# Patient Record
Sex: MALE | Race: WHITE | Employment: FULL TIME | URBAN - METROPOLITAN AREA
[De-identification: names, ages, dates, MRNs, and addresses within clinical notes are randomized per-mention and may not be internally consistent; named-entity substitution may affect disease eponyms.]

---

## 2023-10-16 ENCOUNTER — OFFICE VISIT (OUTPATIENT)
Age: 33
End: 2023-10-16

## 2023-10-16 VITALS
WEIGHT: 190.4 LBS | TEMPERATURE: 98.3 F | HEIGHT: 69 IN | HEART RATE: 92 BPM | OXYGEN SATURATION: 94 % | SYSTOLIC BLOOD PRESSURE: 134 MMHG | BODY MASS INDEX: 28.2 KG/M2 | DIASTOLIC BLOOD PRESSURE: 87 MMHG | RESPIRATION RATE: 18 BRPM

## 2023-10-16 DIAGNOSIS — R51.9 ACUTE NONINTRACTABLE HEADACHE, UNSPECIFIED HEADACHE TYPE: Primary | ICD-10-CM

## 2023-10-16 RX ORDER — LOVASTATIN 10 MG/1
10 TABLET ORAL NIGHTLY
COMMUNITY

## 2023-10-16 ASSESSMENT — ENCOUNTER SYMPTOMS
NAUSEA: 0
COUGH: 0
VOMITING: 0
WHEEZING: 0
SORE THROAT: 0
BACK PAIN: 0
SINUS PRESSURE: 0
ABDOMINAL PAIN: 0
SHORTNESS OF BREATH: 0

## 2023-12-12 ENCOUNTER — OFFICE VISIT (OUTPATIENT)
Age: 33
End: 2023-12-12

## 2023-12-12 VITALS
SYSTOLIC BLOOD PRESSURE: 130 MMHG | OXYGEN SATURATION: 96 % | HEART RATE: 98 BPM | DIASTOLIC BLOOD PRESSURE: 80 MMHG | BODY MASS INDEX: 30.72 KG/M2 | RESPIRATION RATE: 16 BRPM | WEIGHT: 208 LBS | TEMPERATURE: 98.1 F

## 2023-12-12 DIAGNOSIS — R05.1 ACUTE COUGH: Primary | ICD-10-CM

## 2023-12-12 DIAGNOSIS — J20.9 ACUTE BRONCHITIS, UNSPECIFIED ORGANISM: ICD-10-CM

## 2023-12-12 LAB
Lab: NORMAL
QC PASS/FAIL: NORMAL
SARS-COV-2 RDRP RESP QL NAA+PROBE: NEGATIVE

## 2023-12-12 RX ORDER — PREDNISONE 20 MG/1
20 TABLET ORAL 2 TIMES DAILY
Qty: 10 TABLET | Refills: 0 | Status: SHIPPED | OUTPATIENT
Start: 2023-12-12 | End: 2023-12-17

## 2023-12-12 RX ORDER — DEXTROMETHORPHAN HYDROBROMIDE AND PROMETHAZINE HYDROCHLORIDE 15; 6.25 MG/5ML; MG/5ML
5 SYRUP ORAL 4 TIMES DAILY PRN
Qty: 120 ML | Refills: 0 | Status: SHIPPED | OUTPATIENT
Start: 2023-12-12 | End: 2023-12-19

## 2023-12-12 RX ORDER — AZITHROMYCIN 250 MG/1
250 TABLET, FILM COATED ORAL SEE ADMIN INSTRUCTIONS
Qty: 6 TABLET | Refills: 0 | Status: SHIPPED | OUTPATIENT
Start: 2023-12-12 | End: 2023-12-17

## 2023-12-12 ASSESSMENT — ENCOUNTER SYMPTOMS
RHINORRHEA: 0
SINUS PRESSURE: 1
HEARTBURN: 0
COUGH: 1
BACK PAIN: 0
SHORTNESS OF BREATH: 0
EYE REDNESS: 0
WHEEZING: 0
SORE THROAT: 0
HEMOPTYSIS: 0

## 2024-01-26 ENCOUNTER — OFFICE VISIT (OUTPATIENT)
Age: 34
End: 2024-01-26

## 2024-01-26 VITALS
SYSTOLIC BLOOD PRESSURE: 123 MMHG | RESPIRATION RATE: 15 BRPM | BODY MASS INDEX: 32.14 KG/M2 | OXYGEN SATURATION: 96 % | HEART RATE: 105 BPM | DIASTOLIC BLOOD PRESSURE: 83 MMHG | HEIGHT: 69 IN | WEIGHT: 217 LBS | TEMPERATURE: 98.8 F

## 2024-01-26 DIAGNOSIS — J20.9 ACUTE BRONCHITIS, UNSPECIFIED ORGANISM: Primary | ICD-10-CM

## 2024-01-26 RX ORDER — PREDNISONE 20 MG/1
20 TABLET ORAL 2 TIMES DAILY
Qty: 10 TABLET | Refills: 0 | Status: SHIPPED | OUTPATIENT
Start: 2024-01-26 | End: 2024-01-31

## 2024-01-26 RX ORDER — ALBUTEROL SULFATE 90 UG/1
2 AEROSOL, METERED RESPIRATORY (INHALATION) 4 TIMES DAILY PRN
Qty: 18 G | Refills: 0 | Status: SHIPPED | OUTPATIENT
Start: 2024-01-26

## 2024-01-26 RX ORDER — LEVOFLOXACIN 500 MG/1
500 TABLET, FILM COATED ORAL DAILY
Qty: 10 TABLET | Refills: 0 | Status: SHIPPED | OUTPATIENT
Start: 2024-01-26 | End: 2024-02-05

## 2024-01-26 RX ORDER — DEXTROMETHORPHAN HYDROBROMIDE AND PROMETHAZINE HYDROCHLORIDE 15; 6.25 MG/5ML; MG/5ML
5 SYRUP ORAL 4 TIMES DAILY PRN
Qty: 120 ML | Refills: 0 | Status: SHIPPED | OUTPATIENT
Start: 2024-01-26 | End: 2024-02-02

## 2024-01-26 ASSESSMENT — ENCOUNTER SYMPTOMS
HEMOPTYSIS: 0
ABDOMINAL PAIN: 0
CHEST TIGHTNESS: 0
RHINORRHEA: 0
VOMITING: 0
COUGH: 1
WHEEZING: 1
SHORTNESS OF BREATH: 0
DIARRHEA: 0
SORE THROAT: 0
SINUS PRESSURE: 1

## 2024-01-26 NOTE — PROGRESS NOTES
Norbert Rosenbaum (:  1990) is a 33 y.o. male,Established patient, here for evaluation of the following chief complaint(s):  Cough (Cough / chest congestion with chills for 1 week )      ASSESSMENT/PLAN:  1. Acute bronchitis, unspecified organism    - predniSONE (DELTASONE) 20 MG tablet; Take 1 tablet by mouth 2 times daily for 5 days  Dispense: 10 tablet; Refill: 0  - promethazine-dextromethorphan (PROMETHAZINE-DM) 6.25-15 MG/5ML syrup; Take 5 mLs by mouth 4 times daily as needed for Cough  Dispense: 120 mL; Refill: 0  - albuterol sulfate HFA (VENTOLIN HFA) 108 (90 Base) MCG/ACT inhaler; Inhale 2 puffs into the lungs 4 times daily as needed for Wheezing  Dispense: 18 g; Refill: 0  - levoFLOXacin (LEVAQUIN) 500 MG tablet; Take 1 tablet by mouth daily for 10 days  Dispense: 10 tablet; Refill: 0     -rest,increase fluid intake,stop smoking,return to  if worsening symptoms  No follow-ups on file.    SUBJECTIVE/OBJECTIVE:    History provided by:  Patient  Cough  The current episode started 1 to 4 weeks ago. The problem has been gradually worsening. The cough is Productive of sputum. Associated symptoms include nasal congestion and wheezing. Pertinent negatives include no chest pain, chills, fever, headaches, heartburn, hemoptysis, myalgias, rash, rhinorrhea, sore throat, shortness of breath or sweats.       Vitals:    24 1433   BP: 123/83   Site: Right Upper Arm   Position: Sitting   Cuff Size: Large Adult   Pulse: (!) 105   Resp: 15   Temp: 98.8 °F (37.1 °C)   TempSrc: Oral   SpO2: 96%   Weight: 98.4 kg (217 lb)   Height: 1.753 m (5' 9\")       Review of Systems   Constitutional:  Positive for fatigue. Negative for activity change, appetite change, chills, diaphoresis and fever.   HENT:  Positive for congestion and sinus pressure. Negative for rhinorrhea and sore throat.    Respiratory:  Positive for cough and wheezing. Negative for hemoptysis, chest tightness and shortness of breath.    Cardiovascular:

## 2024-02-05 ENCOUNTER — OFFICE VISIT (OUTPATIENT)
Age: 34
End: 2024-02-05

## 2024-02-05 VITALS
WEIGHT: 207.2 LBS | TEMPERATURE: 99 F | OXYGEN SATURATION: 94 % | HEIGHT: 69 IN | DIASTOLIC BLOOD PRESSURE: 82 MMHG | SYSTOLIC BLOOD PRESSURE: 131 MMHG | BODY MASS INDEX: 30.69 KG/M2 | HEART RATE: 120 BPM | RESPIRATION RATE: 20 BRPM

## 2024-02-05 DIAGNOSIS — J06.9 UPPER RESPIRATORY TRACT INFECTION, UNSPECIFIED TYPE: ICD-10-CM

## 2024-02-05 DIAGNOSIS — R05.1 ACUTE COUGH: Primary | ICD-10-CM

## 2024-02-05 LAB
INFLUENZA VIRUS A RNA: NORMAL
INFLUENZA VIRUS B RNA: NORMAL
Lab: NORMAL
PERFORMING INSTRUMENT: NORMAL
QC PASS/FAIL: NORMAL
SARS-COV-2, POC: NORMAL

## 2024-02-05 RX ORDER — GUAIFENESIN AND DEXTROMETHORPHAN HYDROBROMIDE 600; 30 MG/1; MG/1
1 TABLET, EXTENDED RELEASE ORAL 2 TIMES DAILY
Qty: 20 TABLET | Refills: 0 | Status: SHIPPED | OUTPATIENT
Start: 2024-02-05

## 2024-02-05 ASSESSMENT — ENCOUNTER SYMPTOMS
COUGH: 1
WHEEZING: 1
RHINORRHEA: 0
SINUS PRESSURE: 1
SHORTNESS OF BREATH: 0
SORE THROAT: 0

## 2024-02-05 NOTE — PROGRESS NOTES
Norbert Rosenbaum (:  1990) is a 33 y.o. male,Established patient, here for evaluation of the following chief complaint(s):  Cough (Pt also c/o congestion, PT says antibiotic didn't work )      ASSESSMENT/PLAN:  1. Acute cough    - POCT COVID-19, Antigen  - POCT Influenza A/B DNA (Alere i)    2. Upper respiratory tract infection, unspecified type    - Dextromethorphan-guaiFENesin (MUCINEX DM)  MG TB12; Take 1 tablet by mouth in the morning and at bedtime  Dispense: 20 tablet; Refill: 0     -increase fluid intake,take Tylenol as needed,return to  if worsening symptoms  Return if symptoms worsen or fail to improve.    SUBJECTIVE/OBJECTIVE:  Pt was seen on ,s/p prednisone,finished Levaquin yesterday,c/o 3 day h/o cough      History provided by:  Patient  Cough  This is a new problem. The problem has been unchanged. The cough is Productive of sputum. Associated symptoms include nasal congestion and wheezing. Pertinent negatives include no chest pain, chills, fever, headaches, rash, rhinorrhea, sore throat, shortness of breath or sweats.       Vitals:    24 1846   BP: 131/82   Pulse: (!) 120   Resp: 20   Temp: 99 °F (37.2 °C)   SpO2: 94%   Weight: 94 kg (207 lb 3.2 oz)   Height: 1.753 m (5' 9\")       Review of Systems   Constitutional:  Negative for activity change, appetite change, chills and fever.   HENT:  Positive for congestion and sinus pressure. Negative for rhinorrhea and sore throat.    Respiratory:  Positive for cough and wheezing. Negative for shortness of breath.    Cardiovascular:  Negative for chest pain.   Skin:  Negative for rash.   Neurological:  Negative for headaches.       Physical Exam  Constitutional:       General: He is not in acute distress.  HENT:      Nose: Congestion present. No rhinorrhea.      Mouth/Throat:      Mouth: Mucous membranes are moist.      Pharynx: No oropharyngeal exudate or posterior oropharyngeal erythema.   Eyes:      Conjunctiva/sclera: Conjunctivae

## 2024-03-11 ENCOUNTER — OFFICE VISIT (OUTPATIENT)
Age: 34
End: 2024-03-11

## 2024-03-11 VITALS
TEMPERATURE: 97.8 F | RESPIRATION RATE: 16 BRPM | SYSTOLIC BLOOD PRESSURE: 122 MMHG | WEIGHT: 214 LBS | DIASTOLIC BLOOD PRESSURE: 78 MMHG | BODY MASS INDEX: 31.6 KG/M2 | HEART RATE: 86 BPM | OXYGEN SATURATION: 94 %

## 2024-03-11 DIAGNOSIS — R51.9 NONINTRACTABLE HEADACHE, UNSPECIFIED CHRONICITY PATTERN, UNSPECIFIED HEADACHE TYPE: ICD-10-CM

## 2024-03-11 DIAGNOSIS — R11.0 NAUSEA: Primary | ICD-10-CM

## 2024-03-11 RX ORDER — BUPROPION HYDROCHLORIDE 100 MG/1
300 TABLET ORAL DAILY
COMMUNITY

## 2024-03-11 RX ORDER — ONDANSETRON 4 MG/1
4 TABLET, ORALLY DISINTEGRATING ORAL 3 TIMES DAILY PRN
Qty: 21 TABLET | Refills: 0 | Status: SHIPPED | OUTPATIENT
Start: 2024-03-11

## 2024-03-11 RX ORDER — OMEGA-3-ACID ETHYL ESTERS 1 G/1
CAPSULE, LIQUID FILLED ORAL
COMMUNITY
Start: 2024-01-30

## 2024-03-11 RX ORDER — LOSARTAN POTASSIUM 50 MG/1
TABLET ORAL
COMMUNITY
Start: 2023-05-07

## 2024-03-11 RX ORDER — IBUPROFEN 600 MG/1
600 TABLET ORAL 3 TIMES DAILY PRN
Qty: 30 TABLET | Refills: 0 | Status: SHIPPED | OUTPATIENT
Start: 2024-03-11

## 2024-03-11 ASSESSMENT — ENCOUNTER SYMPTOMS
ABDOMINAL PAIN: 0
EYE REDNESS: 0
BACK PAIN: 0
SINUS PRESSURE: 0
DIARRHEA: 0
CHEST TIGHTNESS: 0
VOMITING: 0
CONSTIPATION: 0
SHORTNESS OF BREATH: 0
NAUSEA: 1
COUGH: 0
SORE THROAT: 0

## 2024-03-11 NOTE — PROGRESS NOTES
Eyes:      Conjunctiva/sclera: Conjunctivae normal.      Pupils: Pupils are equal, round, and reactive to light.   Cardiovascular:      Rate and Rhythm: Normal rate and regular rhythm.   Pulmonary:      Effort: Pulmonary effort is normal. No respiratory distress.      Breath sounds: Normal breath sounds.   Abdominal:      General: Bowel sounds are normal. There is no distension.      Palpations: Abdomen is soft.      Tenderness: There is no abdominal tenderness.      Hernia: No hernia is present.   Musculoskeletal:      Cervical back: Neck supple. No rigidity.   Lymphadenopathy:      Cervical: No cervical adenopathy.   Skin:     Findings: No rash.   Neurological:      General: No focal deficit present.      Mental Status: He is alert and oriented to person, place, and time.           An electronic signature was used to authenticate this note.    --VELIA DUGGAN MD

## 2024-04-17 ENCOUNTER — OFFICE VISIT (OUTPATIENT)
Age: 34
End: 2024-04-17

## 2024-04-17 VITALS
WEIGHT: 211.6 LBS | BODY MASS INDEX: 31.25 KG/M2 | SYSTOLIC BLOOD PRESSURE: 127 MMHG | HEART RATE: 100 BPM | OXYGEN SATURATION: 96 % | DIASTOLIC BLOOD PRESSURE: 82 MMHG | TEMPERATURE: 97.6 F

## 2024-04-17 DIAGNOSIS — F31.9 BIPOLAR 1 DISORDER (HCC): Primary | ICD-10-CM

## 2024-04-17 RX ORDER — QUETIAPINE FUMARATE 100 MG/1
100 TABLET, FILM COATED ORAL DAILY
Qty: 7 TABLET | Refills: 0 | Status: SHIPPED | OUTPATIENT
Start: 2024-04-17

## 2024-04-17 ASSESSMENT — ENCOUNTER SYMPTOMS: COUGH: 0

## 2024-04-17 NOTE — PROGRESS NOTES
Norbert Rosenbaum (:  1990) is a 33 y.o. male,Established patient, here for evaluation of the following chief complaint(s):  Other (Patient states he is needing a refill on his Seroquel, has not taken in the last week, will be going to his doctor tomorrow morning. )      ASSESSMENT/PLAN:  1. Bipolar 1 disorder (HCC)    - QUEtiapine (SEROQUEL) 100 MG tablet; Take 1 tablet by mouth daily  Dispense: 7 tablet; Refill: 0       No follow-ups on file.    SUBJECTIVE/OBJECTIVE:  Pt has a h/o Bipolar disorder,out of Seroquel,has appoint with his doctor tomorrow.      History provided by:  Patient      Vitals:    24 1746   BP: 127/82   Site: Right Upper Arm   Position: Sitting   Cuff Size: Large Adult   Pulse: 100   Temp: 97.6 °F (36.4 °C)   TempSrc: Oral   SpO2: 96%   Weight: 96 kg (211 lb 9.6 oz)       Review of Systems   Constitutional:  Negative for activity change, appetite change, fatigue and fever.   Respiratory:  Negative for cough.    Cardiovascular:  Negative for chest pain.   Neurological:  Negative for dizziness and headaches.   Psychiatric/Behavioral:  The patient is nervous/anxious.        Physical Exam  Constitutional:       General: He is not in acute distress.  HENT:      Mouth/Throat:      Mouth: Mucous membranes are moist.      Pharynx: No posterior oropharyngeal erythema.   Eyes:      Conjunctiva/sclera: Conjunctivae normal.      Pupils: Pupils are equal, round, and reactive to light.   Cardiovascular:      Rate and Rhythm: Normal rate.   Pulmonary:      Effort: Pulmonary effort is normal. No respiratory distress.   Musculoskeletal:      Cervical back: Neck supple. No rigidity.   Lymphadenopathy:      Cervical: No cervical adenopathy.   Skin:     Findings: No rash.   Neurological:      General: No focal deficit present.      Mental Status: He is alert and oriented to person, place, and time.   Psychiatric:         Mood and Affect: Mood normal.         Behavior: Behavior normal.           An

## 2024-07-10 ENCOUNTER — OFFICE VISIT (OUTPATIENT)
Age: 34
End: 2024-07-10

## 2024-07-10 VITALS
HEIGHT: 69 IN | SYSTOLIC BLOOD PRESSURE: 100 MMHG | TEMPERATURE: 97.9 F | WEIGHT: 216 LBS | DIASTOLIC BLOOD PRESSURE: 67 MMHG | BODY MASS INDEX: 31.99 KG/M2 | HEART RATE: 95 BPM | OXYGEN SATURATION: 96 %

## 2024-07-10 DIAGNOSIS — S49.91XA INJURY OF RIGHT UPPER EXTREMITY, INITIAL ENCOUNTER: Primary | ICD-10-CM

## 2024-07-10 RX ORDER — IBUPROFEN 600 MG/1
600 TABLET ORAL EVERY 8 HOURS PRN
Qty: 10 TABLET | Refills: 0 | Status: SHIPPED | OUTPATIENT
Start: 2024-07-10

## 2024-07-10 RX ORDER — OMEPRAZOLE 10 MG/1
10 CAPSULE, DELAYED RELEASE ORAL DAILY
COMMUNITY

## 2024-07-10 RX ORDER — CYCLOBENZAPRINE HCL 10 MG
10 TABLET ORAL EVERY 8 HOURS PRN
Qty: 21 TABLET | Refills: 0 | Status: SHIPPED | OUTPATIENT
Start: 2024-07-10 | End: 2024-07-20

## 2024-07-10 NOTE — PROGRESS NOTES
Norbert Rosenbaum (:  1990) is a 34 y.o. male,Established patient, here for evaluation of the following chief complaint(s):  Arm Pain (Right upper arm pain after moving  few days ago, unsure of exact injury )    Assessment & Plan :  Visit Diagnoses and Associated Orders       Injury of right upper extremity, initial encounter    -  Primary         ORDERS WITHOUT AN ASSOCIATED DIAGNOSIS    omeprazole (PRILOSEC) 10 MG delayed release capsule [[id number]]            Right arm injury that occurred with a twisting and lifting motion. Ibuprofen/acetaminophen every 8 hours as needed for pain. Cyclobenzaprine every 8 hours as needed  for spasms/pain.       Referral to orthopedic surgeon for right arm weakness/injury.     Patient verbalized understanding of printed and verbal discharge instructions including follow up care.    Follow up in 7 days if symptoms persist or if symptoms worsen.       Subjective :    History provided by:  Patient  Arm Pain   The incident occurred 2 days ago. The incident occurred at work. The injury mechanism was twisted. The pain is present in the right elbow, upper right arm and right forearm. The quality of the pain is described as shooting. The pain does not radiate. The pain is at a severity of 1/10. The pain is mild. The pain has been Fluctuating since the incident. The symptoms are aggravated by movement.       34 y.o. male presents with symptoms of   pain in the right arm that started two days ago after flipping a large tractor tire, and then was picking it up and flipping it onto his tailgate.   Patient is a [employer] employee.         Vitals:    07/10/24 1458   BP: 100/67   Site: Left Upper Arm   Position: Sitting   Cuff Size: Large Adult   Pulse: 95   Temp: 97.9 °F (36.6 °C)   TempSrc: Oral   SpO2: 96%   Weight: 98 kg (216 lb)   Height: 1.753 m (5' 9\")       No results found for this visit on 07/10/24.      Objective   Physical Exam  Vitals and nursing note reviewed.   Constitutional:

## 2024-10-02 ENCOUNTER — OFFICE VISIT (OUTPATIENT)
Age: 34
End: 2024-10-02

## 2024-10-02 VITALS
HEART RATE: 78 BPM | WEIGHT: 226.6 LBS | TEMPERATURE: 98.1 F | DIASTOLIC BLOOD PRESSURE: 79 MMHG | HEIGHT: 69 IN | BODY MASS INDEX: 33.56 KG/M2 | OXYGEN SATURATION: 97 % | RESPIRATION RATE: 18 BRPM | SYSTOLIC BLOOD PRESSURE: 118 MMHG

## 2024-10-02 DIAGNOSIS — L23.7 POISON IVY DERMATITIS: Primary | ICD-10-CM

## 2024-10-02 RX ORDER — PREDNISONE 20 MG/1
20 TABLET ORAL 2 TIMES DAILY
Qty: 10 TABLET | Refills: 0 | Status: SHIPPED | OUTPATIENT
Start: 2024-10-02 | End: 2024-10-07

## 2024-10-02 RX ORDER — TRIAMCINOLONE ACETONIDE 0.25 MG/G
CREAM TOPICAL
Qty: 15 G | Refills: 0 | Status: SHIPPED | OUTPATIENT
Start: 2024-10-02

## 2024-10-02 NOTE — PROGRESS NOTES
Norbert Rosenbaum (:  1990) is a 34 y.o. male,Established patient, here for evaluation of the following chief complaint(s):  Poison Ivy (Spread all over. Sxs started last week.)      Assessment & Plan :  Visit Diagnoses and Associated Orders       Poison ivy dermatitis    -  Primary    predniSONE (DELTASONE) 20 MG tablet [[id number]]      triamcinolone (KENALOG) 0.025 % cream [[id number]]                   Take medications as directed. Follow up with PCP if symptoms do not improve. Emergency follow up required for symptoms including, but not limited to, shortness of breath, mental status change, fevers >101, difficulty swallowing or inability to swallow, dehydration, or rapid worsening of symptoms.  See printed instructions given at discharge.     Subjective :  Poison Ivy (Spread all over. Sxs started last week.)  Diffuse maculopapular/vesicular, itchy rash noted.      History provided by:  Patient  Poison Ivy  This is a new problem. The current episode started in the past 7 days. The problem has been waxing and waning since onset. The rash is diffuse. The rash is characterized by itchiness and redness. Past treatments include nothing. The treatment provided mild relief.       34 y.o. male presents with symptoms of diffuse poison ivy         There were no vitals filed for this visit.    No results found for this visit on 10/02/24.      Objective   Physical Exam  Constitutional:       General: He is not in acute distress.     Appearance: He is well-developed.   HENT:      Right Ear: External ear normal.      Left Ear: External ear normal.      Nose: Nose normal.      Mouth/Throat:      Lips: Pink.   Eyes:      General: Lids are normal.   Cardiovascular:      Rate and Rhythm: Normal rate.   Pulmonary:      Effort: Pulmonary effort is normal.   Skin:     General: Skin is warm and dry.      Findings: Erythema and rash present. Rash is macular, papular and vesicular.   Neurological:      Mental Status: He is alert.

## 2024-10-02 NOTE — PATIENT INSTRUCTIONS
Discharge medications reviewed with the patient and appropriate educational materials and side effects teaching were provided.    Please follow discharge education given at discharge.

## 2025-04-09 ENCOUNTER — OFFICE VISIT (OUTPATIENT)
Dept: PRIMARY CARE CLINIC | Age: 35
End: 2025-04-09
Payer: MEDICAID

## 2025-04-09 VITALS
HEART RATE: 65 BPM | HEIGHT: 68 IN | WEIGHT: 218 LBS | BODY MASS INDEX: 33.04 KG/M2 | SYSTOLIC BLOOD PRESSURE: 128 MMHG | OXYGEN SATURATION: 98 % | DIASTOLIC BLOOD PRESSURE: 74 MMHG

## 2025-04-09 DIAGNOSIS — F31.9 BIPOLAR AFFECTIVE DISORDER, REMISSION STATUS UNSPECIFIED (HCC): ICD-10-CM

## 2025-04-09 DIAGNOSIS — Z00.01 ENCOUNTER FOR ROUTINE ADULT HEALTH EXAMINATION WITH ABNORMAL FINDINGS: ICD-10-CM

## 2025-04-09 DIAGNOSIS — E11.9 TYPE 2 DIABETES MELLITUS WITHOUT COMPLICATION, WITHOUT LONG-TERM CURRENT USE OF INSULIN: ICD-10-CM

## 2025-04-09 DIAGNOSIS — Z87.898 HISTORY OF BENZODIAZEPINE USE: ICD-10-CM

## 2025-04-09 DIAGNOSIS — F20.9 SCHIZOPHRENIA, UNSPECIFIED TYPE: ICD-10-CM

## 2025-04-09 DIAGNOSIS — I10 HYPERTENSION, UNSPECIFIED TYPE: ICD-10-CM

## 2025-04-09 DIAGNOSIS — Z00.01 ENCOUNTER FOR ROUTINE ADULT HEALTH EXAMINATION WITH ABNORMAL FINDINGS: Primary | ICD-10-CM

## 2025-04-09 LAB
ALBUMIN SERPL-MCNC: 4.5 G/DL (ref 3.4–5)
ALBUMIN/GLOB SERPL: 2 {RATIO} (ref 1.1–2.2)
ALP SERPL-CCNC: 57 U/L (ref 40–129)
ALT SERPL-CCNC: 35 U/L (ref 10–40)
ANION GAP SERPL CALCULATED.3IONS-SCNC: 10 MMOL/L (ref 3–16)
AST SERPL-CCNC: 23 U/L (ref 15–37)
BILIRUB SERPL-MCNC: 0.3 MG/DL (ref 0–1)
BUN SERPL-MCNC: 9 MG/DL (ref 7–20)
CALCIUM SERPL-MCNC: 9.5 MG/DL (ref 8.3–10.6)
CHLORIDE SERPL-SCNC: 102 MMOL/L (ref 99–110)
CHOLEST SERPL-MCNC: 162 MG/DL (ref 0–199)
CO2 SERPL-SCNC: 28 MMOL/L (ref 21–32)
CREAT SERPL-MCNC: 1 MG/DL (ref 0.9–1.3)
CREAT UR-MCNC: 137 MG/DL (ref 39–259)
DEPRECATED RDW RBC AUTO: 13 % (ref 12.4–15.4)
GFR SERPLBLD CREATININE-BSD FMLA CKD-EPI: >90 ML/MIN/{1.73_M2}
GLUCOSE SERPL-MCNC: 160 MG/DL (ref 70–99)
HCT VFR BLD AUTO: 46.6 % (ref 40.5–52.5)
HDLC SERPL-MCNC: 36 MG/DL (ref 40–60)
HGB BLD-MCNC: 15.7 G/DL (ref 13.5–17.5)
LDLC SERPL CALC-MCNC: 105 MG/DL
MCH RBC QN AUTO: 30 PG (ref 26–34)
MCHC RBC AUTO-ENTMCNC: 33.6 G/DL (ref 31–36)
MCV RBC AUTO: 89.5 FL (ref 80–100)
MICROALBUMIN UR DL<=1MG/L-MCNC: <1.2 MG/DL
MICROALBUMIN/CREAT UR: NORMAL MG/G (ref 0–30)
PLATELET # BLD AUTO: 185 K/UL (ref 135–450)
PMV BLD AUTO: 10.4 FL (ref 5–10.5)
POTASSIUM SERPL-SCNC: 4.8 MMOL/L (ref 3.5–5.1)
PROT SERPL-MCNC: 6.8 G/DL (ref 6.4–8.2)
RBC # BLD AUTO: 5.21 M/UL (ref 4.2–5.9)
SODIUM SERPL-SCNC: 140 MMOL/L (ref 136–145)
TRIGL SERPL-MCNC: 104 MG/DL (ref 0–150)
TSH SERPL DL<=0.005 MIU/L-ACNC: 1.74 UIU/ML (ref 0.27–4.2)
VLDLC SERPL CALC-MCNC: 21 MG/DL
WBC # BLD AUTO: 5.7 K/UL (ref 4–11)

## 2025-04-09 PROCEDURE — 99204 OFFICE O/P NEW MOD 45 MIN: CPT | Performed by: STUDENT IN AN ORGANIZED HEALTH CARE EDUCATION/TRAINING PROGRAM

## 2025-04-09 PROCEDURE — 3078F DIAST BP <80 MM HG: CPT | Performed by: STUDENT IN AN ORGANIZED HEALTH CARE EDUCATION/TRAINING PROGRAM

## 2025-04-09 PROCEDURE — 3074F SYST BP LT 130 MM HG: CPT | Performed by: STUDENT IN AN ORGANIZED HEALTH CARE EDUCATION/TRAINING PROGRAM

## 2025-04-09 PROCEDURE — 99385 PREV VISIT NEW AGE 18-39: CPT | Performed by: STUDENT IN AN ORGANIZED HEALTH CARE EDUCATION/TRAINING PROGRAM

## 2025-04-09 RX ORDER — LOSARTAN POTASSIUM 50 MG/1
50 TABLET ORAL DAILY
Qty: 30 TABLET | Refills: 0 | Status: SHIPPED | OUTPATIENT
Start: 2025-04-09

## 2025-04-09 RX ORDER — BLOOD SUGAR DIAGNOSTIC
1 STRIP MISCELLANEOUS 3 TIMES DAILY
COMMUNITY
Start: 2025-03-06

## 2025-04-09 RX ORDER — GUANFACINE 2 MG/1
2 TABLET ORAL DAILY
COMMUNITY
Start: 2025-02-19

## 2025-04-09 RX ORDER — LOSARTAN POTASSIUM 50 MG/1
50 TABLET ORAL DAILY
COMMUNITY
Start: 2025-02-06 | End: 2025-04-09 | Stop reason: SDUPTHER

## 2025-04-09 RX ORDER — QUETIAPINE FUMARATE 400 MG/1
400 TABLET, FILM COATED ORAL
COMMUNITY
Start: 2025-02-28

## 2025-04-09 RX ORDER — OMEGA-3-ACID ETHYL ESTERS 1 G/1
3 CAPSULE, LIQUID FILLED ORAL DAILY
COMMUNITY
Start: 2025-02-06

## 2025-04-09 RX ORDER — SILDENAFIL 100 MG/1
TABLET, FILM COATED ORAL
COMMUNITY
Start: 2025-01-18

## 2025-04-09 RX ORDER — BUPROPION HYDROCHLORIDE 150 MG/1
150 TABLET ORAL EVERY MORNING
COMMUNITY
Start: 2025-02-23

## 2025-04-09 RX ORDER — BLOOD-GLUCOSE METER
EACH MISCELLANEOUS
COMMUNITY
Start: 2025-03-08

## 2025-04-09 RX ORDER — LANCETS 33 GAUGE
1 EACH MISCELLANEOUS 3 TIMES DAILY
COMMUNITY
Start: 2025-03-06

## 2025-04-09 RX ORDER — METFORMIN HYDROCHLORIDE EXTENDED-RELEASE TABLETS 500 MG/1
500 TABLET, FILM COATED, EXTENDED RELEASE ORAL 2 TIMES DAILY WITH MEALS
COMMUNITY
Start: 2025-03-08 | End: 2025-04-09 | Stop reason: ALTCHOICE

## 2025-04-09 SDOH — ECONOMIC STABILITY: FOOD INSECURITY: WITHIN THE PAST 12 MONTHS, THE FOOD YOU BOUGHT JUST DIDN'T LAST AND YOU DIDN'T HAVE MONEY TO GET MORE.: NEVER TRUE

## 2025-04-09 SDOH — ECONOMIC STABILITY: FOOD INSECURITY: WITHIN THE PAST 12 MONTHS, YOU WORRIED THAT YOUR FOOD WOULD RUN OUT BEFORE YOU GOT MONEY TO BUY MORE.: NEVER TRUE

## 2025-04-09 ASSESSMENT — COLUMBIA-SUICIDE SEVERITY RATING SCALE - C-SSRS
1. WITHIN THE PAST MONTH, HAVE YOU WISHED YOU WERE DEAD OR WISHED YOU COULD GO TO SLEEP AND NOT WAKE UP?: NO
6. HAVE YOU EVER DONE ANYTHING, STARTED TO DO ANYTHING, OR PREPARED TO DO ANYTHING TO END YOUR LIFE?: NO
2. HAVE YOU ACTUALLY HAD ANY THOUGHTS OF KILLING YOURSELF?: NO

## 2025-04-09 ASSESSMENT — ENCOUNTER SYMPTOMS
COUGH: 0
SHORTNESS OF BREATH: 0
NAUSEA: 0
VOMITING: 0
DIARRHEA: 0
ABDOMINAL PAIN: 0

## 2025-04-09 ASSESSMENT — PATIENT HEALTH QUESTIONNAIRE - PHQ9
3. TROUBLE FALLING OR STAYING ASLEEP: NEARLY EVERY DAY
5. POOR APPETITE OR OVEREATING: NEARLY EVERY DAY
7. TROUBLE CONCENTRATING ON THINGS, SUCH AS READING THE NEWSPAPER OR WATCHING TELEVISION: MORE THAN HALF THE DAYS
2. FEELING DOWN, DEPRESSED OR HOPELESS: NEARLY EVERY DAY
SUM OF ALL RESPONSES TO PHQ QUESTIONS 1-9: 23
4. FEELING TIRED OR HAVING LITTLE ENERGY: NEARLY EVERY DAY
10. IF YOU CHECKED OFF ANY PROBLEMS, HOW DIFFICULT HAVE THESE PROBLEMS MADE IT FOR YOU TO DO YOUR WORK, TAKE CARE OF THINGS AT HOME, OR GET ALONG WITH OTHER PEOPLE: EXTREMELY DIFFICULT
8. MOVING OR SPEAKING SO SLOWLY THAT OTHER PEOPLE COULD HAVE NOTICED. OR THE OPPOSITE, BEING SO FIGETY OR RESTLESS THAT YOU HAVE BEEN MOVING AROUND A LOT MORE THAN USUAL: NEARLY EVERY DAY
9. THOUGHTS THAT YOU WOULD BE BETTER OFF DEAD, OR OF HURTING YOURSELF: NOT AT ALL
6. FEELING BAD ABOUT YOURSELF - OR THAT YOU ARE A FAILURE OR HAVE LET YOURSELF OR YOUR FAMILY DOWN: NEARLY EVERY DAY
SUM OF ALL RESPONSES TO PHQ QUESTIONS 1-9: 23
SUM OF ALL RESPONSES TO PHQ QUESTIONS 1-9: 23
1. LITTLE INTEREST OR PLEASURE IN DOING THINGS: NEARLY EVERY DAY
SUM OF ALL RESPONSES TO PHQ QUESTIONS 1-9: 23

## 2025-04-09 ASSESSMENT — ANXIETY QUESTIONNAIRES
GAD7 TOTAL SCORE: 19
2. NOT BEING ABLE TO STOP OR CONTROL WORRYING: MORE THAN HALF THE DAYS
3. WORRYING TOO MUCH ABOUT DIFFERENT THINGS: NEARLY EVERY DAY
6. BECOMING EASILY ANNOYED OR IRRITABLE: NEARLY EVERY DAY
IF YOU CHECKED OFF ANY PROBLEMS ON THIS QUESTIONNAIRE, HOW DIFFICULT HAVE THESE PROBLEMS MADE IT FOR YOU TO DO YOUR WORK, TAKE CARE OF THINGS AT HOME, OR GET ALONG WITH OTHER PEOPLE: EXTREMELY DIFFICULT
1. FEELING NERVOUS, ANXIOUS, OR ON EDGE: NEARLY EVERY DAY
5. BEING SO RESTLESS THAT IT IS HARD TO SIT STILL: NEARLY EVERY DAY
4. TROUBLE RELAXING: NEARLY EVERY DAY
7. FEELING AFRAID AS IF SOMETHING AWFUL MIGHT HAPPEN: MORE THAN HALF THE DAYS

## 2025-04-09 NOTE — PROGRESS NOTES
using dragon dictation software.  Although every effort was made to ensure accuracy; inadvertent, unintentional transcription errors may have occurred.

## 2025-04-09 NOTE — ASSESSMENT & PLAN NOTE
Obtain A1c, lipid panel, ACR, CMP  Refill metformin 500 mg BID  Follow up in 2 weeks for full DM visit

## 2025-04-09 NOTE — ASSESSMENT & PLAN NOTE
- BP today: 128/74, Goal BP: <140/90 per JNC8 Guidelines  - BP Log: No  - Current medication regimen: losartan  - Diet/Exercise: No  - Tobacco Use: Yes  - Labs Reviewed:  BMP:   Lab Results   Component Value Date/Time     08/23/2018 10:03 AM    K 4.1 08/23/2018 10:03 AM     08/23/2018 10:03 AM    CO2 28 08/23/2018 10:03 AM    BUN 12 08/23/2018 10:03 AM    CREATININE 1.0 08/23/2018 10:03 AM    GLUCOSE 106 08/23/2018 10:03 AM    CALCIUM 8.9 08/23/2018 10:03 AM      Micro/Creatinine: No results found for: \"ALBUMINUR\", \"LABCREAU\", \"ALBCREAT\"  Lipid: No results found for: \"TRIG\", \"HDL\"  Plan  - Refill losartan 50 mg daily  - Obtain lipid panel, CMP, A1c, ACR

## 2025-04-09 NOTE — ASSESSMENT & PLAN NOTE
Cancer Screenings  - Not at age range for cancer screening    Smokers  Lung Cancer  Low Dose CT Scan if ages 50-80 and  - 20+ pack year history and/or  - Still smoking or stopped smoking in last 15 years    AAA  - 1 time screening for AAA with U/S in men aged 65 to 75 who have ever smoked    Advised Smoking Cessation- see tobacco user for details    Vaccinations  Influenza vaccine:  recommended every fall    Lab Work  - Routine labs ordered    Depression Screening  PHQ-9 Total Score: 23 (4/9/2025 11:15 AM)  Thoughts that you would be better off dead, or of hurting yourself in some way: 0 (4/9/2025 11:15 AM)

## 2025-04-10 ENCOUNTER — RESULTS FOLLOW-UP (OUTPATIENT)
Dept: PRIMARY CARE CLINIC | Age: 35
End: 2025-04-10

## 2025-04-10 LAB
EST. AVERAGE GLUCOSE BLD GHB EST-MCNC: 246 MG/DL
HBA1C MFR BLD: 10.2 %

## 2025-05-09 PROBLEM — Z00.01 ENCOUNTER FOR ROUTINE ADULT HEALTH EXAMINATION WITH ABNORMAL FINDINGS: Status: RESOLVED | Noted: 2025-04-09 | Resolved: 2025-05-09

## 2025-05-29 ENCOUNTER — OFFICE VISIT (OUTPATIENT)
Dept: PRIMARY CARE CLINIC | Age: 35
End: 2025-05-29
Payer: MEDICAID

## 2025-05-29 VITALS
DIASTOLIC BLOOD PRESSURE: 68 MMHG | BODY MASS INDEX: 32.84 KG/M2 | OXYGEN SATURATION: 98 % | SYSTOLIC BLOOD PRESSURE: 102 MMHG | WEIGHT: 216 LBS | HEART RATE: 79 BPM

## 2025-05-29 DIAGNOSIS — N52.9 ERECTILE DYSFUNCTION, UNSPECIFIED ERECTILE DYSFUNCTION TYPE: ICD-10-CM

## 2025-05-29 DIAGNOSIS — I10 HYPERTENSION, UNSPECIFIED TYPE: ICD-10-CM

## 2025-05-29 DIAGNOSIS — E11.9 TYPE 2 DIABETES MELLITUS WITHOUT COMPLICATION, WITHOUT LONG-TERM CURRENT USE OF INSULIN (HCC): Primary | ICD-10-CM

## 2025-05-29 PROCEDURE — 99214 OFFICE O/P EST MOD 30 MIN: CPT | Performed by: STUDENT IN AN ORGANIZED HEALTH CARE EDUCATION/TRAINING PROGRAM

## 2025-05-29 PROCEDURE — 3074F SYST BP LT 130 MM HG: CPT | Performed by: STUDENT IN AN ORGANIZED HEALTH CARE EDUCATION/TRAINING PROGRAM

## 2025-05-29 PROCEDURE — 3078F DIAST BP <80 MM HG: CPT | Performed by: STUDENT IN AN ORGANIZED HEALTH CARE EDUCATION/TRAINING PROGRAM

## 2025-05-29 PROCEDURE — G2211 COMPLEX E/M VISIT ADD ON: HCPCS | Performed by: STUDENT IN AN ORGANIZED HEALTH CARE EDUCATION/TRAINING PROGRAM

## 2025-05-29 PROCEDURE — 3046F HEMOGLOBIN A1C LEVEL >9.0%: CPT | Performed by: STUDENT IN AN ORGANIZED HEALTH CARE EDUCATION/TRAINING PROGRAM

## 2025-05-29 RX ORDER — LOSARTAN POTASSIUM 50 MG/1
50 TABLET ORAL DAILY
Qty: 90 TABLET | Refills: 0 | Status: SHIPPED | OUTPATIENT
Start: 2025-05-29 | End: 2025-08-27

## 2025-05-29 RX ORDER — SILDENAFIL 100 MG/1
100 TABLET, FILM COATED ORAL PRN
Qty: 30 TABLET | Refills: 0 | Status: SHIPPED | OUTPATIENT
Start: 2025-05-29

## 2025-05-29 RX ORDER — OMEGA-3-ACID ETHYL ESTERS 1 G/1
1 CAPSULE, LIQUID FILLED ORAL DAILY
Qty: 60 CAPSULE | Refills: 3 | Status: CANCELLED | OUTPATIENT
Start: 2025-05-29

## 2025-05-29 RX ORDER — SILDENAFIL 100 MG/1
100 TABLET, FILM COATED ORAL PRN
Qty: 30 TABLET | Refills: 3 | Status: CANCELLED | OUTPATIENT
Start: 2025-05-29

## 2025-05-29 RX ORDER — HYDROCHLOROTHIAZIDE 12.5 MG/1
CAPSULE ORAL
Qty: 4 EACH | Refills: 0 | Status: SHIPPED | OUTPATIENT
Start: 2025-05-29

## 2025-05-29 ASSESSMENT — ENCOUNTER SYMPTOMS
NAUSEA: 0
DIARRHEA: 0
VOMITING: 0
ABDOMINAL PAIN: 0
SHORTNESS OF BREATH: 0
COUGH: 0

## 2025-05-29 NOTE — PROGRESS NOTES
Melissa Duran MD   ONETOUCH VERIO strip 1 each 3 times daily Yes Melissa Duran MD   Blood Glucose Monitoring Suppl (ONETOUCH VERIO FLEX SYSTEM) w/Device KIT USE TO TEST BLOOD SUGAR DAILY Yes Melissa Duran MD   omeprazole (PRILOSEC) 10 MG delayed release capsule Take 1 capsule by mouth daily Yes Melissa Duran MD   ibuprofen (ADVIL;MOTRIN) 600 MG tablet Take 1 tablet by mouth every 8 hours as needed for Pain Yes Carole Aragon APRN - CNP   buPROPion (WELLBUTRIN) 100 MG tablet Take 3 tablets by mouth daily Yes Melissa Duran MD   omega-3 acid ethyl esters (LOVAZA) 1 g capsule  Yes Melissa Duran MD   multivitamin-iron-minerals-folic acid (CENTRUM) chewable tablet Take 1 tablet by mouth daily  Patient not taking: Reported on 5/29/2025  Melissa Duran MD      Allergies   Allergen Reactions    Latex      Past Medical History:   Diagnosis Date    Anxiety     Bipolar 1 disorder (HCC)     Depression     Diabetes mellitus (HCC)     Hypertension     Schizophrenia (HCC)      No past surgical history on file.  Social History     Socioeconomic History    Marital status: Unknown     Spouse name: Not on file    Number of children: Not on file    Years of education: Not on file    Highest education level: Not on file   Occupational History    Not on file   Tobacco Use    Smoking status: Every Day     Current packs/day: 0.25     Types: Cigarettes     Passive exposure: Current    Smokeless tobacco: Never   Vaping Use    Vaping status: Never Used   Substance and Sexual Activity    Alcohol use: Not Currently     Comment: OCC    Drug use: Not Currently    Sexual activity: Yes     Partners: Female   Other Topics Concern    Not on file   Social History Narrative    ** Merged History Encounter **          Social Drivers of Health     Financial Resource Strain: Not on file   Food Insecurity: No Food Insecurity (4/9/2025)    Hunger Vital Sign     Worried About Running Out of Food in the

## 2025-05-29 NOTE — ASSESSMENT & PLAN NOTE
- BP today: 102/68, Goal BP: <140/90 per JNC8 Guidelines  - BP Log: No  - Current medication regimen: losartan  - Diet/Exercise: No  - Tobacco Use: Yes  - Labs Reviewed:  BMP:   Lab Results   Component Value Date/Time     04/09/2025 12:03 PM    K 4.8 04/09/2025 12:03 PM     04/09/2025 12:03 PM    CO2 28 04/09/2025 12:03 PM    BUN 9 04/09/2025 12:03 PM    CREATININE 1.0 04/09/2025 12:03 PM    GLUCOSE 160 04/09/2025 12:03 PM    CALCIUM 9.5 04/09/2025 12:03 PM      Micro/Creatinine:   Lab Results   Component Value Date/Time    ALBUMINUR <1.20 04/09/2025 12:03 PM    LABCREAU 137.0 04/09/2025 12:03 PM    ALBCREAT see below 04/09/2025 12:03 PM     Lipid:   Lab Results   Component Value Date/Time    TRIG 104 04/09/2025 12:03 PM    HDL 36 04/09/2025 12:03 PM     Plan  - Refill losartan 50 mg daily, may have to decrease dose in near future given softer BP today  - Discussed lab results

## 2025-05-29 NOTE — ASSESSMENT & PLAN NOTE
We discussed his ED should show improvement with better A1c control. Patient described psychogenic ED as well which is not managed with medication. However, patient has had this medication for a long time. We discussed that I will provide one refill for this and by this time I would like to see his sugars improve and re-evaluate ED. He should not be on Viagra long term.

## 2025-05-29 NOTE — ASSESSMENT & PLAN NOTE
- Last A1C:   Hemoglobin A1C   Date Value Ref Range Status   04/09/2025 10.2 See comment % Final     Comment:     Comment:  Diagnosis of Diabetes: > or = 6.5%  Increased risk of diabetes (Prediabetes): 5.7-6.4%  Glycemic Control: Nonpregnant Adults: <7.0%                    Pregnant: <6.0%          - Current medication regimen:   Key Antihyperglycemic Medications              metFORMIN (GLUCOPHAGE) 500 MG tablet (Taking) Take 1 tablet by mouth 2 times daily (with meals)           - Sugar Log: Inconsistent, will start CGM  - Diet/Exercise: No  - Last microalbumin (Nephropathy screening at least yearly):   Lab Results   Component Value Date/Time    ALBUMINUR <1.20 04/09/2025 12:03 PM    LABCREAU 137.0 04/09/2025 12:03 PM    ALBCREAT see below 04/09/2025 12:03 PM     - Last eye exam (Retinopathy screening yearly): Ophthalmology referral placed  - Last foot exam (Diabetic Foot Exam Yearly): done today, normal  - Smoking status:   Tobacco Use      Smoking status: Every Day        Packs/day: 0.25        Types: Cigarettes        Passive exposure: Current      Smokeless tobacco: Never    - Statin: no  - ACE/ARB: yes- losartan 50 mg  - Last lipid panel/LDL:   Lab Results   Component Value Date     (H) 04/09/2025    HDL 36 (L) 04/09/2025     - PCV20: due  Plan  - Increase metformin to 1000 mg  - Add Trulicity 0.75 mg weekly x 4 weeks  - Diabetic Neuropathy: signs and therapy.  Foot care: advised to wash feet daily, pat dry and apply lotion at night, avoiding between toes. Need to look at feet daily and report to a physician any signs of inflammation or skin damage. Discussed diabetes shoes and socks.  Diabetic retinopathy: the most frequent cause of blindness in [place] currently. Measures to prevent that.  Diabetic nephropathy: Kidney function, microalbumin as a sign of diabetic nephropathy. Stages of kidney disease.  Nutrition as a mainstream of diabetes therapy. Deaver about label reading.

## 2025-06-23 ENCOUNTER — TELEPHONE (OUTPATIENT)
Dept: PRIMARY CARE CLINIC | Age: 35
End: 2025-06-23

## 2025-06-23 NOTE — TELEPHONE ENCOUNTER
Norbert called and schedule an Acute visit for tomorrow with Dr. Rondon regarding his Blood sugar levels He says when he eats food with sugar and when he goes to sleep his sugar is dropping dramatically. His Free Style Yobani 3 would read 120 to 55. He is not sure why this is happening and would like to discuss it with Dr. Rondon.

## 2025-06-25 ENCOUNTER — OFFICE VISIT (OUTPATIENT)
Dept: PRIMARY CARE CLINIC | Age: 35
End: 2025-06-25
Payer: MEDICAID

## 2025-06-25 VITALS
WEIGHT: 213.4 LBS | DIASTOLIC BLOOD PRESSURE: 68 MMHG | HEART RATE: 92 BPM | BODY MASS INDEX: 32.45 KG/M2 | OXYGEN SATURATION: 95 % | SYSTOLIC BLOOD PRESSURE: 114 MMHG | TEMPERATURE: 97.1 F

## 2025-06-25 DIAGNOSIS — I10 HYPERTENSION, UNSPECIFIED TYPE: ICD-10-CM

## 2025-06-25 DIAGNOSIS — E11.9 TYPE 2 DIABETES MELLITUS WITHOUT COMPLICATION, WITHOUT LONG-TERM CURRENT USE OF INSULIN (HCC): Primary | ICD-10-CM

## 2025-06-25 PROCEDURE — G2211 COMPLEX E/M VISIT ADD ON: HCPCS | Performed by: STUDENT IN AN ORGANIZED HEALTH CARE EDUCATION/TRAINING PROGRAM

## 2025-06-25 PROCEDURE — 3078F DIAST BP <80 MM HG: CPT | Performed by: STUDENT IN AN ORGANIZED HEALTH CARE EDUCATION/TRAINING PROGRAM

## 2025-06-25 PROCEDURE — 3074F SYST BP LT 130 MM HG: CPT | Performed by: STUDENT IN AN ORGANIZED HEALTH CARE EDUCATION/TRAINING PROGRAM

## 2025-06-25 PROCEDURE — 3046F HEMOGLOBIN A1C LEVEL >9.0%: CPT | Performed by: STUDENT IN AN ORGANIZED HEALTH CARE EDUCATION/TRAINING PROGRAM

## 2025-06-25 PROCEDURE — 99214 OFFICE O/P EST MOD 30 MIN: CPT | Performed by: STUDENT IN AN ORGANIZED HEALTH CARE EDUCATION/TRAINING PROGRAM

## 2025-06-25 RX ORDER — HYDROCHLOROTHIAZIDE 12.5 MG/1
CAPSULE ORAL
Qty: 4 EACH | Refills: 0 | Status: SHIPPED | OUTPATIENT
Start: 2025-06-25

## 2025-06-25 ASSESSMENT — ENCOUNTER SYMPTOMS
SHORTNESS OF BREATH: 0
ABDOMINAL PAIN: 0
NAUSEA: 0
VOMITING: 0
COUGH: 0
DIARRHEA: 0

## 2025-06-25 NOTE — ASSESSMENT & PLAN NOTE
- Last A1C:   Hemoglobin A1C   Date Value Ref Range Status   04/09/2025 10.2 See comment % Final     Comment:     Comment:  Diagnosis of Diabetes: > or = 6.5%  Increased risk of diabetes (Prediabetes): 5.7-6.4%  Glycemic Control: Nonpregnant Adults: <7.0%                    Pregnant: <6.0%          - Current medication regimen:   Key Antihyperglycemic Medications              metFORMIN (GLUCOPHAGE) 500 MG tablet (Taking) Take 2 tablets by mouth 2 times daily (with meals)    dulaglutide (TRULICITY) 0.75 MG/0.5ML SOAJ SC injection (Taking) Inject 0.5 mLs into the skin every 7 days           - Sugar Log: Inconsistent, will start CGM  - Diet/Exercise: No  - Last microalbumin (Nephropathy screening at least yearly):   Lab Results   Component Value Date/Time    ALBUMINUR <1.20 04/09/2025 12:03 PM    LABCREAU 137.0 04/09/2025 12:03 PM    ALBCREAT see below 04/09/2025 12:03 PM     - Last eye exam (Retinopathy screening yearly): Ophthalmology referral placed  - Last foot exam (Diabetic Foot Exam Yearly): done today, normal  - Smoking status:   Tobacco Use      Smoking status: Every Day        Packs/day: 0.25        Types: Cigarettes        Passive exposure: Current      Smokeless tobacco: Never    - Statin: no  - ACE/ARB: yes- losartan 50 mg  - Last lipid panel/LDL:   Lab Results   Component Value Date     (H) 04/09/2025    HDL 36 (L) 04/09/2025     - PCV20: due  Plan  - Continue metformin to 1000 mg  - Continue Trulicity 0.75 mg weekly, he reports frequently forgetting to take this medication

## 2025-06-25 NOTE — PROGRESS NOTES
2025    SUBJECTIVE  Chief Complaint   Patient presents with    Follow-up    Diabetes      Norbert Rosenbaum (:  1990) is a 35 y.o. male w/ PMHx of HTN, T2DM, schizophrenia, bipolar disorder, Hx of benzodiazepine abuse presenting as an established patient for follow up of hypoglycemia episodes which have occurred the past two nights. He showed me his freestyle jorge readouts which did not show any consistent episodes of hypoglycemia.    Patient Active Problem List   Diagnosis    Type 2 diabetes mellitus without complication, without long-term current use of insulin (HCC)    HTN (hypertension)    Schizophrenia (HCC)    Bipolar disorder (HCC)    History of benzodiazepine use    ED (erectile dysfunction)       Review of Systems   Constitutional:  Negative for chills, fatigue and fever.   Respiratory:  Negative for cough and shortness of breath.    Cardiovascular:  Negative for chest pain.   Gastrointestinal:  Negative for abdominal pain, diarrhea, nausea and vomiting.   Musculoskeletal:  Negative for arthralgias.   Skin:  Negative for rash.   Neurological:  Negative for dizziness, syncope, weakness, light-headedness, numbness and headaches.       Prior to Visit Medications    Medication Sig Taking? Authorizing Provider   Continuous Glucose Sensor (FREESTYLE JORGE 3 PLUS SENSOR) MISC Place subcutaneously, replace every 14 days. Yes Patel Rondon MD   losartan (COZAAR) 50 MG tablet Take 1 tablet by mouth daily Yes Patel Rondon MD   metFORMIN (GLUCOPHAGE) 500 MG tablet Take 2 tablets by mouth 2 times daily (with meals) Yes Patel Rondon MD   dulaglutide (TRULICITY) 0.75 MG/0.5ML SOAJ SC injection Inject 0.5 mLs into the skin every 7 days Yes Patel Rondon MD   buPROPion (WELLBUTRIN XL) 150 MG extended release tablet Take 1 tablet by mouth every morning Yes Melissa Duran MD   guanFACINE HCl 2 MG TABS Take 2 mg by mouth daily Yes Melissa Duran MD   Lancets (ONETOUCH DELICA PLUS

## 2025-06-25 NOTE — ASSESSMENT & PLAN NOTE
- BP today: 114/68, Goal BP: <140/90 per JNC8 Guidelines  - BP Log: No  - Current medication regimen: losartan  - Diet/Exercise: No  - Tobacco Use: Yes  - Labs Reviewed:  BMP:   Lab Results   Component Value Date/Time     04/09/2025 12:03 PM    K 4.8 04/09/2025 12:03 PM     04/09/2025 12:03 PM    CO2 28 04/09/2025 12:03 PM    BUN 9 04/09/2025 12:03 PM    CREATININE 1.0 04/09/2025 12:03 PM    GLUCOSE 160 04/09/2025 12:03 PM    CALCIUM 9.5 04/09/2025 12:03 PM      Micro/Creatinine:   Lab Results   Component Value Date/Time    ALBUMINUR <1.20 04/09/2025 12:03 PM    LABCREAU 137.0 04/09/2025 12:03 PM    ALBCREAT see below 04/09/2025 12:03 PM     Lipid:   Lab Results   Component Value Date/Time    TRIG 104 04/09/2025 12:03 PM    HDL 36 04/09/2025 12:03 PM     Plan  - Continue losartan 50 mg daily

## 2025-06-27 DIAGNOSIS — E11.9 TYPE 2 DIABETES MELLITUS WITHOUT COMPLICATION, WITHOUT LONG-TERM CURRENT USE OF INSULIN (HCC): ICD-10-CM

## 2025-06-27 NOTE — TELEPHONE ENCOUNTER
LastVisit 6/25/2025   LastLabs 4/9/2025   NextVisit 7/23/2025   LastRefilled 5/29/2025  Pharmacy:    [facility] [id number] - [place] [id number] - P [phone] - F [phone]     pharmacy confirmed in EPIC

## 2025-07-28 ENCOUNTER — TELEPHONE (OUTPATIENT)
Dept: PRIMARY CARE CLINIC | Age: 35
End: 2025-07-28

## 2025-07-28 DIAGNOSIS — E11.9 TYPE 2 DIABETES MELLITUS WITHOUT COMPLICATION, WITHOUT LONG-TERM CURRENT USE OF INSULIN (HCC): ICD-10-CM

## 2025-07-28 NOTE — TELEPHONE ENCOUNTER
Patient states his girlfriend was trying to cuddle with him and she accidentally ripped out his Yobani 3 plus and he still had 12 days left with it.     Patient is asking for a new one:    Continuous Glucose Sensor (FREESTYLE YOBANI 3 PLUS SENSOR) [facility] [[id number]]

## 2025-07-28 NOTE — TELEPHONE ENCOUNTER
Recent Visits  Date Type Provider Dept   06/25/25 Office Visit Patel Rondon MD [facility]   05/29/25 Office Visit Patel Rondon MD [facility]   04/09/25 Office Visit Patel Rondon MD [facility]   Showing recent visits within past 540 days with a meds authorizing provider and meeting all other requirements  Future Appointments  Date Type Provider Dept   07/30/25 Appointment Patel Rondon MD [facility]   Showing future appointments within next 150 days with a meds authorizing provider and meeting all other requirements     6/25/2025

## 2025-07-29 RX ORDER — HYDROCHLOROTHIAZIDE 12.5 MG/1
CAPSULE ORAL
Qty: 4 EACH | Refills: 0 | Status: SHIPPED | OUTPATIENT
Start: 2025-07-29

## 2025-07-31 ENCOUNTER — TELEPHONE (OUTPATIENT)
Dept: PRIMARY CARE CLINIC | Age: 35
End: 2025-07-31

## 2025-07-31 NOTE — TELEPHONE ENCOUNTER
Patient called in, he missed a visit yesterday and didn't realize.     Scheduled acute visit for 08/01.     Patient states his sugar numbers have been dropping. Yesterday, they were in the 50's. He took several glucose tablets to try and bring it up, but it didn't go up much.     Today, his sugar levels are staying in the 60's.     His normal is .     He is also experiencing Confusion, leg pain, and outburst of anger from being easily overstimulated.       Again, patient is scheduled for an acute visit with Dr. Rondon 08/01/25 but also states may go to ED today.     Please reach out. Thank you.

## 2025-08-07 ENCOUNTER — OFFICE VISIT (OUTPATIENT)
Dept: INTERNAL MEDICINE CLINIC | Age: 35
End: 2025-08-07
Payer: MEDICAID

## 2025-08-07 VITALS
HEART RATE: 70 BPM | OXYGEN SATURATION: 97 % | HEIGHT: 68 IN | BODY MASS INDEX: 31.74 KG/M2 | SYSTOLIC BLOOD PRESSURE: 124 MMHG | TEMPERATURE: 98.1 F | DIASTOLIC BLOOD PRESSURE: 78 MMHG | WEIGHT: 209.4 LBS

## 2025-08-07 DIAGNOSIS — F20.9 SCHIZOPHRENIA, UNSPECIFIED TYPE (HCC): ICD-10-CM

## 2025-08-07 DIAGNOSIS — I10 HYPERTENSION, UNSPECIFIED TYPE: ICD-10-CM

## 2025-08-07 DIAGNOSIS — L24.9 IRRITANT CONTACT DERMATITIS, UNSPECIFIED TRIGGER: Primary | ICD-10-CM

## 2025-08-07 DIAGNOSIS — Z87.898 HISTORY OF BENZODIAZEPINE USE: ICD-10-CM

## 2025-08-07 DIAGNOSIS — F31.9 BIPOLAR AFFECTIVE DISORDER, REMISSION STATUS UNSPECIFIED (HCC): ICD-10-CM

## 2025-08-07 DIAGNOSIS — Z87.898 HISTORY OF ALCOHOL USE DISORDER: ICD-10-CM

## 2025-08-07 DIAGNOSIS — E11.9 TYPE 2 DIABETES MELLITUS WITHOUT COMPLICATION, WITHOUT LONG-TERM CURRENT USE OF INSULIN (HCC): ICD-10-CM

## 2025-08-07 DIAGNOSIS — E78.2 MIXED HYPERLIPIDEMIA: ICD-10-CM

## 2025-08-07 LAB — HBA1C MFR BLD: 6.9 %

## 2025-08-07 PROCEDURE — 83036 HEMOGLOBIN GLYCOSYLATED A1C: CPT

## 2025-08-07 PROCEDURE — 3044F HG A1C LEVEL LT 7.0%: CPT

## 2025-08-07 PROCEDURE — 99204 OFFICE O/P NEW MOD 45 MIN: CPT

## 2025-08-07 PROCEDURE — 3078F DIAST BP <80 MM HG: CPT

## 2025-08-07 PROCEDURE — 3074F SYST BP LT 130 MM HG: CPT

## 2025-08-07 RX ORDER — TRIAMCINOLONE ACETONIDE 0.25 MG/G
OINTMENT TOPICAL
Qty: 15 G | Refills: 0 | Status: SHIPPED | OUTPATIENT
Start: 2025-08-07 | End: 2025-08-14

## 2025-08-07 RX ORDER — ATORVASTATIN CALCIUM 20 MG/1
20 TABLET, FILM COATED ORAL DAILY
Qty: 90 TABLET | Refills: 0 | Status: SHIPPED | OUTPATIENT
Start: 2025-08-07 | End: 2025-11-05

## 2025-08-07 RX ORDER — CITALOPRAM HYDROBROMIDE 20 MG/1
20 TABLET ORAL DAILY
COMMUNITY

## 2025-08-07 RX ORDER — HYDROCHLOROTHIAZIDE 12.5 MG/1
CAPSULE ORAL
Qty: 4 EACH | Refills: 0 | Status: SHIPPED | OUTPATIENT
Start: 2025-08-07